# Patient Record
Sex: FEMALE | Race: BLACK OR AFRICAN AMERICAN | Employment: FULL TIME | URBAN - METROPOLITAN AREA
[De-identification: names, ages, dates, MRNs, and addresses within clinical notes are randomized per-mention and may not be internally consistent; named-entity substitution may affect disease eponyms.]

---

## 2022-04-11 ENCOUNTER — OFFICE VISIT (OUTPATIENT)
Dept: DERMATOLOGY | Age: 22
End: 2022-04-11
Payer: MEDICARE

## 2022-04-11 VITALS
OXYGEN SATURATION: 98 % | SYSTOLIC BLOOD PRESSURE: 109 MMHG | DIASTOLIC BLOOD PRESSURE: 73 MMHG | BODY MASS INDEX: 20.97 KG/M2 | HEIGHT: 60 IN | WEIGHT: 106.8 LBS | HEART RATE: 67 BPM | TEMPERATURE: 97.9 F

## 2022-04-11 DIAGNOSIS — L70.0 ACNE VULGARIS: Primary | ICD-10-CM

## 2022-04-11 PROCEDURE — G8420 CALC BMI NORM PARAMETERS: HCPCS | Performed by: PHYSICIAN ASSISTANT

## 2022-04-11 PROCEDURE — 99204 OFFICE O/P NEW MOD 45 MIN: CPT | Performed by: PHYSICIAN ASSISTANT

## 2022-04-11 PROCEDURE — 1036F TOBACCO NON-USER: CPT | Performed by: PHYSICIAN ASSISTANT

## 2022-04-11 PROCEDURE — G8428 CUR MEDS NOT DOCUMENT: HCPCS | Performed by: PHYSICIAN ASSISTANT

## 2022-04-11 RX ORDER — CLINDAMYCIN PHOSPHATE 10 UG/ML
LOTION TOPICAL
Qty: 60 ML | Refills: 3 | Status: SHIPPED | OUTPATIENT
Start: 2022-04-11

## 2022-04-11 RX ORDER — SPIRONOLACTONE 50 MG/1
50 TABLET, FILM COATED ORAL DAILY
Qty: 30 TABLET | Refills: 2 | Status: SHIPPED | OUTPATIENT
Start: 2022-04-11 | End: 2022-07-10

## 2022-04-11 RX ORDER — FLUTICASONE PROPIONATE 0.05 %
CREAM (GRAM) TOPICAL
COMMUNITY

## 2022-04-11 NOTE — PROGRESS NOTES
Dermatology Patient Note  [facility] [address]  [place] [address]  Dept: [phone]  Dept Fax: [phone]      VISITDATE: 4/11/2022   REFERRING PROVIDER: No ref. provider found      Liyah Blair is a 22 y.o. female  who presents today in the office for:    New Patient (Ance: pt has tried BPO, Clindamycin, doxy, adapalene. She also complains of darkspots on the face. )      HISTORY OF PRESENT ILLNESS:  As above. Pr states that tretinoin 0.05% dried her face too much. MEDICAL PROBLEMS:  There are no problems to display for this patient. CURRENT MEDICATIONS:   Current Outpatient Medications   Medication Sig Dispense Refill    triamcinolone (KENALOG) 0.1 % ointment APPLY TOPICALLY TO THE AFFECTED AREA EVERY DAY AT BEDTIME      fluticasone (CUTIVATE) 0.05 % cream fluticasone propionate 0.05 % topical cream   APPLY A THIN LAYER TOPICALLY TO ECZ[place] ON FOREHEAD TWICE DAILY UNTIL RESOLVED      spironolactone (ALDACTONE) 50 MG tablet Take 1 tablet by mouth daily 30 tablet 2    benzoyl peroxide 5 % external liquid Wash affected areas once daily 227 g 3    tretinoin (RETIN-A) 0.025 % cream Apply pea sized amount to face nightly 45 g 3    clindamycin (CLEOCIN T) 1 % lotion Apply to face in the morning 60 mL 3     No current facility-administered medications for this visit. ALLERGIES:   No Known Allergies    SOCIAL HISTORY:  Social History     Tobacco Use    Smoking status: Never Smoker    Smokeless tobacco: Never Used   Substance Use Topics    Alcohol use: Never       Pertinent ROS:  Review of Systems  Skin: Denies any new changing, growing or bleeding lesions or rashes except as described in the HPI   Constitutional: Denies fevers, chills, and malaise.     PHYSICAL EXAM:   /73 (Site: Left Upper Arm, Position: Sitting, Cuff Size: Medium Adult)   Pulse 67   Temp 97.9 °F (36.6 °C) (Temporal)   Ht 5' (1.524 m)   Wt 106 lb 12.8 oz (48.4 kg)   SpO2 98%   BMI 20.86 kg/m²     The patient is generally well appearing, well nourished, alert and conversational. Affect is normal.    Cutaneous Exam:  Physical Exam  Face and neck only was examined. Facial covering was removed during examination. Diagnoses/exam findings/medical history pertinent to this visit are listed below:    Assessment:   Diagnosis Orders   1. Acne vulgaris  spironolactone (ALDACTONE) 50 MG tablet    benzoyl peroxide 5 % external liquid    tretinoin (RETIN-A) 0.025 % cream    clindamycin (CLEOCIN T) 1 % lotion   - chronic illness with progression and/or exacerbation     Plan:  1. Acne vulgaris (Face)  Discussed spironolactone with patient. The patient was informed of common side effects such as increased urination/urinary frequency, menstrual irregularities with mid-cycle spotting, breast tenderness, decreased libido, fatigue, headache, and dizziness. Patient informed to stop taking medication and to immediately report any new onset muscle cramps or weakness, or palpitations. Patient informed that pregnancy needs to be avoided while on this medication. Discussed risk of elevated potassium and the need to stay away from potassium supplements while on the medication. - spironolactone (ALDACTONE) 50 MG tablet; Take 1 tablet by mouth daily  Dispense: 30 tablet; Refill: 2  - benzoyl peroxide 5 % external liquid; Wash affected areas once daily  Dispense: 227 g; Refill: 3  - tretinoin (RETIN-A) 0.025 % cream; Apply pea sized amount to face nightly  Dispense: 45 g; Refill: 3  - clindamycin (CLEOCIN T) 1 % lotion; Apply to face in the morning  Dispense: 60 mL; Refill: 3    - Isotretinoin will be 2nd line Tx    RTC 3M    Future Appointments   Date Time Provider Drew Balderas   7/12/2022 10:30 AM Neva Castleman, PA-C [id number]         Patient Instructions   Acne    Watch your heat in the showers, hot showers will strip the skin of your natural oils. Apply the Retin-a at night. IF you are becoming dry on the Retin-a apply cervea moisturizer let it sink into the skin for a few minutes then apply Retin-a. Clindamycin lotion in the morning along with the spironolactone 50MG once a day.      BPO wash once a day     Recheck in 3 months         Electronically signed by Maciel Loja PA-C on 4/11/22 at 9:04 AM EDT

## 2022-04-11 NOTE — PATIENT INSTRUCTIONS
Acne    Watch your heat in the showers, hot showers will strip the skin of your natural oils. Apply the Retin-a at night. IF you are becoming dry on the Retin-a apply cervea moisturizer let it sink into the skin for a few minutes then apply Retin-a. Clindamycin lotion in the morning along with the spironolactone 50MG once a day.      BPO wash once a day     Recheck in 3 months

## 2025-04-04 ENCOUNTER — OFFICE VISIT (OUTPATIENT)
Age: 25
End: 2025-04-04

## 2025-04-04 VITALS
DIASTOLIC BLOOD PRESSURE: 75 MMHG | BODY MASS INDEX: 18.78 KG/M2 | TEMPERATURE: 98.1 F | WEIGHT: 110 LBS | HEIGHT: 64 IN | OXYGEN SATURATION: 99 % | HEART RATE: 76 BPM | SYSTOLIC BLOOD PRESSURE: 115 MMHG

## 2025-04-04 DIAGNOSIS — Z11.3 SCREEN FOR STD (SEXUALLY TRANSMITTED DISEASE): Primary | ICD-10-CM

## 2025-04-04 LAB
BILIRUBIN, POC: NORMAL
BLOOD URINE, POC: NORMAL
CLARITY, POC: NORMAL
COLOR, POC: YELLOW
CONTROL: NORMAL
GLUCOSE URINE, POC: NORMAL MG/DL
KETONES, POC: NORMAL MG/DL
LEUKOCYTE EST, POC: NORMAL
NITRITE, POC: NORMAL
PH, POC: NORMAL
PREGNANCY TEST URINE, POC: NORMAL
PROTEIN, POC: NORMAL MG/DL
SPECIFIC GRAVITY, POC: NORMAL
UROBILINOGEN, POC: NORMAL

## 2025-04-04 ASSESSMENT — ENCOUNTER SYMPTOMS
SHORTNESS OF BREATH: 0
BACK PAIN: 0
ABDOMINAL PAIN: 0
EYES NEGATIVE: 1
COLOR CHANGE: 0

## 2025-04-04 NOTE — PROGRESS NOTES
[facility], [facility] ([place])  [facility] [place] [address]  Dept: [phone]    Date: 25    Jennifer Arambula (:  2000) is a 24 y.o. female, here for evaluation of the following chief complaint(s):  Sexually Transmitted Diseases      HPI    History provided by:  Patient  Sexually Transmitted Diseases   The patient's pertinent negatives include no discharge, dysuria, genital itching or genital rash. This is a new problem. The current episode started today. Pertinent negatives include no abdominal pain, genital odor or urinary frequency.    Patient here for routine STD testing - no symptoms, no known exposure.     ROS  Review of Systems   HENT: Negative.     Eyes: Negative.    Respiratory:  Negative for shortness of breath.    Cardiovascular:  Negative for chest pain and palpitations.   Gastrointestinal:  Negative for abdominal pain.   Genitourinary:  Negative for dysuria, frequency, urgency, vaginal discharge and vaginal pain.   Musculoskeletal:  Negative for back pain.   Skin:  Negative for color change.   Neurological:  Negative for dizziness and headaches.       PHYSICAL EXAM  Vitals:    25 1609   BP: 115/75   BP Site: Right Upper Arm   Patient Position: Sitting   Pulse: 76   Temp: 98.1 °F (36.7 °C)   SpO2: 99%   Weight: 49.9 kg (110 lb)   Height: 1.626 m (5' 4\")     Physical Exam  Constitutional:       General: She is not in acute distress.     Appearance: Normal appearance.   HENT:      Head: Normocephalic.      Nose: Nose normal.   Eyes:      Extraocular Movements: Extraocular movements intact.   Cardiovascular:      Rate and Rhythm: Normal rate and regular rhythm.   Pulmonary:      Effort: Pulmonary effort is normal. No respiratory distress.      Breath sounds: Normal breath sounds.   Abdominal:      General: Abdomen is flat.      Palpations: Abdomen is soft.      Tenderness: There is no abdominal tenderness.   Skin:     General: Skin is

## 2025-04-13 ENCOUNTER — RESULTS FOLLOW-UP (OUTPATIENT)
Age: 25
End: 2025-04-13